# Patient Record
Sex: FEMALE | Race: WHITE | NOT HISPANIC OR LATINO | URBAN - METROPOLITAN AREA
[De-identification: names, ages, dates, MRNs, and addresses within clinical notes are randomized per-mention and may not be internally consistent; named-entity substitution may affect disease eponyms.]

---

## 2023-02-08 ENCOUNTER — OFFICE (OUTPATIENT)
Dept: URBAN - METROPOLITAN AREA CLINIC 26 | Facility: CLINIC | Age: 53
End: 2023-02-08

## 2023-02-08 DIAGNOSIS — K22.2 ESOPHAGEAL OBSTRUCTION: ICD-10-CM

## 2023-02-08 DIAGNOSIS — K21.9 GASTRO-ESOPHAGEAL REFLUX DISEASE WITHOUT ESOPHAGITIS: ICD-10-CM

## 2023-02-08 PROCEDURE — 99213 OFFICE O/P EST LOW 20 MIN: CPT | Performed by: INTERNAL MEDICINE

## 2023-02-08 RX ORDER — PANTOPRAZOLE SODIUM 40 MG/1
TABLET, DELAYED RELEASE ORAL
Status: COMPLETED
End: 2023-02-08

## 2023-02-08 RX ORDER — DEXLANSOPRAZOLE 30 MG/1
30 CAPSULE, DELAYED RELEASE ORAL
Qty: 30 | Refills: 3 | Status: ACTIVE
Start: 2023-02-08

## 2023-03-17 VITALS
TEMPERATURE: 97.7 F | DIASTOLIC BLOOD PRESSURE: 81 MMHG | SYSTOLIC BLOOD PRESSURE: 121 MMHG | HEART RATE: 101 BPM | HEIGHT: 65 IN

## 2024-04-23 ENCOUNTER — APPOINTMENT (OUTPATIENT)
Dept: ORTHOPEDIC SURGERY | Facility: CLINIC | Age: 54
End: 2024-04-23
Payer: COMMERCIAL

## 2024-04-30 ENCOUNTER — OFFICE VISIT (OUTPATIENT)
Dept: ORTHOPEDIC SURGERY | Facility: CLINIC | Age: 54
End: 2024-04-30
Payer: COMMERCIAL

## 2024-04-30 DIAGNOSIS — M77.12 LATERAL EPICONDYLITIS OF LEFT ELBOW: ICD-10-CM

## 2024-04-30 DIAGNOSIS — G56.02 CARPAL TUNNEL SYNDROME OF LEFT WRIST: ICD-10-CM

## 2024-04-30 PROCEDURE — L3908 WHO COCK-UP NONMOLDE PRE OTS: HCPCS | Performed by: STUDENT IN AN ORGANIZED HEALTH CARE EDUCATION/TRAINING PROGRAM

## 2024-04-30 PROCEDURE — 20550 NJX 1 TENDON SHEATH/LIGAMENT: CPT | Performed by: STUDENT IN AN ORGANIZED HEALTH CARE EDUCATION/TRAINING PROGRAM

## 2024-04-30 PROCEDURE — 99203 OFFICE O/P NEW LOW 30 MIN: CPT | Performed by: STUDENT IN AN ORGANIZED HEALTH CARE EDUCATION/TRAINING PROGRAM

## 2024-04-30 RX ORDER — LIDOCAINE HYDROCHLORIDE 10 MG/ML
1 INJECTION INFILTRATION; PERINEURAL
Status: COMPLETED | OUTPATIENT
Start: 2024-04-30 | End: 2024-04-30

## 2024-04-30 RX ADMIN — LIDOCAINE HYDROCHLORIDE 1 ML: 10 INJECTION INFILTRATION; PERINEURAL at 09:50

## 2024-04-30 NOTE — PROGRESS NOTES
History of Present Illness:  Presents with  left elbow pain.  The symptoms have been present for months-year. The patient denies any inciting trauma. The pain is localized about the lateral aspect of the elbow. It is described as moderate. The pain occurs intermittantly and is worse with gripping and lifting activities. The patient presents due to persistent symptoms even with activity modification.      Review of Systems   GENERAL: Negative for malaise, significant weight loss, fever  MUSCULOSKELETAL: see HPI  NEURO:  Negative    The patient's past medical history, family history, social history, and review of systems were reviewed. History is otherwise negative except as stated in the HPI.    Physical Examination:  General: Alert and oriented to person, place, and time.  No acute distress and breathing comfortably: Pleasant and cooperative with examination.  HEENT: Head is normocephalic and atraumatic.  Neck: Supple, no visible swelling.  Cardiovascular: No palpable tachycardia  Lungs: No audible wheezing or labored breathing  Abdomen: Nondistended.  On musculoskeletal examination, the patient has full elbow range of motion. There is no obvious instability with varus and valgus testing. There is tenderness to palpation about the lateral epicondyle. There is no tenderness to palpation medially, anteriorly, or posteriorly. Pain is illicited with resisted wrist extension and forearm supination. Pain is worse with elbow extension and wrist flexion. Provocative maneuvers over the cubital tunnel are negative.  In regards to the wrist, there is no obvious deformity. Range of motion is full in flexion, extension, pronation, supination, and radial/ulnar deviation. There is no tenderness to palpation. Sensation and motor function are intact in the radial, ulnar, and median nerve distribution. There is no obvious thenar or intrinsic atrophy. All fingers are without triggering. The patient can make a full composite fist.  The hand itself is warm and well perfused. The skin is intact throughout. The contralateral hand and wrist are normal to inspection, range of motion, stability, and strength.    Hand / UE Inj/Asp: L elbow for lateral epicondylitis on 4/30/2024 9:50 AM  Indications: pain  Details: 24 G needle, lateral approach  Medications: 10 mg triamcinolone acetonide 10 mg/mL; 1 mL lidocaine 10 mg/mL (1 %)  Outcome: tolerated well, no immediate complications  Procedure, treatment alternatives, risks and benefits explained, specific risks discussed. Immediately prior to procedure a time out was called to verify the correct patient, procedure, equipment, support staff and site/side marked as required.             Assessment:  Patient with left lateral epicondylitis     Plan:  I had a long discussion with the patient regarding the diagnosis of lateral epicondylitis and its treatment. I explained that it is a self-limiting condition that tends to resolve spontaneously.  She will start sleeping in a wrist brace at night.  Additionally physical therapy prescribed.     I have offered an injection to to persistent debilitating pain. After obtaining consent, I injected a 1mL combination of Kenalog and 1% lidocaine into the lateral epicondyle using standard, sterile technique. The injection site was dressed, and the patient tolerated the injection well.     Follow up: 6 weeks    Jess Nolasco MD

## 2024-05-10 ENCOUNTER — HOSPITAL ENCOUNTER
Age: 54
Discharge: HOME | End: 2024-05-10
Payer: COMMERCIAL

## 2024-05-10 DIAGNOSIS — I83.813: Primary | ICD-10-CM

## 2024-06-11 ENCOUNTER — APPOINTMENT (OUTPATIENT)
Dept: ORTHOPEDIC SURGERY | Facility: CLINIC | Age: 54
End: 2024-06-11
Payer: COMMERCIAL

## 2025-03-05 ENCOUNTER — HOSPITAL ENCOUNTER (OUTPATIENT)
Dept: RADIOLOGY | Facility: CLINIC | Age: 55
Discharge: HOME | End: 2025-03-05
Payer: COMMERCIAL

## 2025-03-05 ENCOUNTER — APPOINTMENT (OUTPATIENT)
Dept: ORTHOPEDIC SURGERY | Facility: CLINIC | Age: 55
End: 2025-03-05
Payer: COMMERCIAL

## 2025-03-05 VITALS — BODY MASS INDEX: 26.46 KG/M2 | WEIGHT: 155 LBS | HEIGHT: 64 IN

## 2025-03-05 DIAGNOSIS — M25.511 ACUTE PAIN OF RIGHT SHOULDER: ICD-10-CM

## 2025-03-05 PROCEDURE — 73030 X-RAY EXAM OF SHOULDER: CPT | Mod: RT

## 2025-03-05 PROCEDURE — 99213 OFFICE O/P EST LOW 20 MIN: CPT | Performed by: STUDENT IN AN ORGANIZED HEALTH CARE EDUCATION/TRAINING PROGRAM

## 2025-03-05 PROCEDURE — 3008F BODY MASS INDEX DOCD: CPT | Performed by: STUDENT IN AN ORGANIZED HEALTH CARE EDUCATION/TRAINING PROGRAM

## 2025-03-05 PROCEDURE — 1036F TOBACCO NON-USER: CPT | Performed by: STUDENT IN AN ORGANIZED HEALTH CARE EDUCATION/TRAINING PROGRAM

## 2025-03-05 NOTE — PROGRESS NOTES
Chief Complaint   Patient presents with    Right Shoulder - Pain       HPI  54-year-old female presents today for evaluation of her right shoulder endorsing popping for the past 4 to 6 weeks.  No recent history of trauma.  Very active enjoys working out.  Denies any pain.  No recent history of trauma.  Denies any numbness or tingling.  Notices the popping particular with exercise has become more prominent as of late.  Does take multiple supplements.    Past Medical History:   Diagnosis Date    Other conditions influencing health status 11/07/2014    Menarche    Other conditions influencing health status     History of pregnancy    Personal history of other diseases of the respiratory system 11/07/2014    Personal history of asthma       Past Surgical History:   Procedure Laterality Date    OTHER SURGICAL HISTORY  03/09/2021    No history of surgery        No Known Allergies     Physical exam    General: Alert and oriented to place, person, and time.  No acute distress and breathing comfortably; pleasant and cooperative with the examination.  HEENT: Head is normocephalic and atraumatic.  Neck: Supple, no visible swelling.  Cardiovascular: Good perfusion to the affected extremity.  Lungs: No audible wheezing or labored breathing.  Abdomen: Nondistended  HEME/Lymph : No visible abnormalities bilateral lower extremity    Extremity:  Right shoulder: Palpable pop audible pop felt with abduction external rotation above the level of the head  Skin healthy to gross inspection  No ecchymosis, no edema, no gross atrophy  No tenderness to palpation over acromioclavicular joint  No tenderness to palpation over biceps tendon  No tenderness to palpation over the cervical spine      ROM:  Full forward flexion  Full external rotation  IR symmetric to contralateral side  Strength:  5/5 Resisted elevation  5/5 Resisted external rotation     Negative lift off test   Negative Spurling´s test  Negative Neer and Carolina´s  test  Negative Speed's test  Neurovascular exam normal distally    Diagnostics:  X-rays collected in clinic today my interpretation as follows: No acute osseous abnormality no fracture dislocation appreciated maintained joint space of the glenohumeral articulation    Procedure:  Procedures    Assessment:  54-year-old female with possible SLAP tear right shoulder currently nonpainful    Treatment plan:  The natural history of the condition and its associated treatment alternatives including surgical and nonsurgical options were discussed with the patient at length.  Constellation of findings discussed with Purvi today risk benefits alternative treatment discussed as she does not really have any pain at all with her shoulder at this point in time we will continue with expectant management.  Will provide a physician directed home exercise program regimen to work on periscapular strengthening  Discussed activities to avoid as well as importance of using pain as a guide  If continues to have mechanical type symptoms or worsening pain about her shoulder she will return to clinic for repeat evaluation and possible MRI versus intra-articular injection  All of the patient's questions were answered.

## 2025-07-08 DIAGNOSIS — Z12.31 ENCOUNTER FOR SCREENING MAMMOGRAM FOR BREAST CANCER: ICD-10-CM
